# Patient Record
Sex: MALE | Race: WHITE
[De-identification: names, ages, dates, MRNs, and addresses within clinical notes are randomized per-mention and may not be internally consistent; named-entity substitution may affect disease eponyms.]

---

## 2019-10-29 ENCOUNTER — HOSPITAL ENCOUNTER (OUTPATIENT)
Dept: HOSPITAL 47 - OR | Age: 24
Discharge: HOME | End: 2019-10-29
Attending: SURGERY
Payer: MEDICAID

## 2019-10-29 VITALS — RESPIRATION RATE: 16 BRPM | TEMPERATURE: 98.5 F

## 2019-10-29 VITALS — BODY MASS INDEX: 25.7 KG/M2

## 2019-10-29 VITALS — DIASTOLIC BLOOD PRESSURE: 89 MMHG | HEART RATE: 82 BPM | SYSTOLIC BLOOD PRESSURE: 146 MMHG

## 2019-10-29 DIAGNOSIS — Z82.49: ICD-10-CM

## 2019-10-29 DIAGNOSIS — F84.0: ICD-10-CM

## 2019-10-29 DIAGNOSIS — Z91.048: ICD-10-CM

## 2019-10-29 DIAGNOSIS — L72.0: Primary | ICD-10-CM

## 2019-10-29 DIAGNOSIS — Z79.899: ICD-10-CM

## 2019-10-29 PROCEDURE — 88304 TISSUE EXAM BY PATHOLOGIST: CPT

## 2019-10-29 PROCEDURE — 11406 EXC TR-EXT B9+MARG >4.0 CM: CPT

## 2019-10-29 NOTE — P.OP
Date of Procedure: 10/29/19


Preoperative Diagnosis: 


Skin lesion on abdomen, possible sebaceous cyst


Postoperative Diagnosis: 


Skin lesion on abdomen, possible sebaceous cyst


Procedure(s) Performed: 


Excision lesion of the abdomen, 6 x 4 cm


Surgeon: Girish Kern


Pathology: other (skin lesion abdomen)


Condition: stable


Disposition: same day


Indications for Procedure: 


24-year-old developmentally delayed male presented secondary to skin lesion on 

the abdomen.  This lesion was noted to have significant skin changes along with 

occasional drainage.  Plan was for excision of this lesion.


Operative Findings: 


6 x 4 cm skin lesion excised, no purulent drainage


Description of Procedure: 


The patient was brought into the operating suite and placed in supine position 

on the operating table.  Sedation was provided by anesthesia and the patient was

prepped and draped in regular sterile fashion.  An elliptical incision was made 

over the palpable skin lesion site.  This measured 6 x 4 cm.  Dissection was 

carried to excise the lesion and completion with cautery.  Hemostasis was 

maintained.  Skin lesion was closed with multiple 2-0 nylon sutures in mattress 

form.  The patient was awakened in the operating suite and taken to 

postanesthesia care unit in stable condition.

## 2022-12-21 ENCOUNTER — HOSPITAL ENCOUNTER (OUTPATIENT)
Dept: HOSPITAL 47 - RADXRYALE | Age: 27
Discharge: HOME | End: 2022-12-21
Attending: INTERNAL MEDICINE
Payer: MEDICAID

## 2022-12-21 DIAGNOSIS — M79.671: Primary | ICD-10-CM

## 2022-12-21 NOTE — XR
EXAMINATION TYPE: XR foot complete RT

 

DATE OF EXAM: 12/21/2022

 

CLINICAL HISTORY: pain

 

TECHNIQUE: Frontal, lateral and oblique images of the right foot are obtained.

 

COMPARISON: None.

 

FINDINGS:  There is no acute fracture/dislocation evident. The joint spaces  appear within normal khan
its.  The overlying soft tissue appears unremarkable.

 

IMPRESSION:  

There is no acute fracture or dislocation.

 

ICD 10 NO FRACTURE, INITIAL EVALUATION

## 2022-12-22 ENCOUNTER — HOSPITAL ENCOUNTER (OUTPATIENT)
Dept: HOSPITAL 47 - RADXRYALE | Age: 27
Discharge: HOME | End: 2022-12-22
Attending: INTERNAL MEDICINE
Payer: MEDICAID

## 2022-12-22 DIAGNOSIS — S99.921A: Primary | ICD-10-CM

## 2022-12-22 NOTE — XR
EXAMINATION TYPE: XR ankle complete RT

 

DATE OF EXAM: 12/22/2022

 

COMPARISON: NONE

 

HISTORY: Pain

 

TECHNIQUE:  Frontal, lateral and oblique images of the right ankle are obtained.

 

COMPARISON: None.

 

FINDINGS:  There is no acute fracture/dislocation evident. The joint spaces  appear within normal khan
its.  The overlying soft tissue appears unremarkable.

 

IMPRESSION:  There is no acute fracture or dislocation seen.

## 2023-01-03 ENCOUNTER — HOSPITAL ENCOUNTER (OUTPATIENT)
Dept: HOSPITAL 47 - RADMRIMAIN | Age: 28
Discharge: HOME | End: 2023-01-03
Attending: INTERNAL MEDICINE
Payer: MEDICAID

## 2023-01-03 ENCOUNTER — HOSPITAL ENCOUNTER (OUTPATIENT)
Dept: HOSPITAL 47 - ORWHC2ENDO | Age: 28
Discharge: HOME | End: 2023-01-03
Attending: INTERNAL MEDICINE
Payer: MEDICAID

## 2023-01-03 VITALS — TEMPERATURE: 97.8 F

## 2023-01-03 VITALS — DIASTOLIC BLOOD PRESSURE: 84 MMHG | SYSTOLIC BLOOD PRESSURE: 134 MMHG | HEART RATE: 81 BPM | RESPIRATION RATE: 18 BRPM

## 2023-01-03 VITALS — BODY MASS INDEX: 25.9 KG/M2

## 2023-01-03 DIAGNOSIS — Z79.83: ICD-10-CM

## 2023-01-03 DIAGNOSIS — S99.921A: Primary | ICD-10-CM

## 2023-01-03 DIAGNOSIS — R60.0: ICD-10-CM

## 2023-01-03 DIAGNOSIS — Z88.8: ICD-10-CM

## 2023-01-03 DIAGNOSIS — F84.0: ICD-10-CM

## 2023-01-03 DIAGNOSIS — R56.9: ICD-10-CM

## 2023-01-03 DIAGNOSIS — Z79.811: ICD-10-CM

## 2023-01-03 DIAGNOSIS — K29.50: Primary | ICD-10-CM

## 2023-01-03 DIAGNOSIS — I25.10: ICD-10-CM

## 2023-01-03 DIAGNOSIS — Z79.1: ICD-10-CM

## 2023-01-03 DIAGNOSIS — K20.90: ICD-10-CM

## 2023-01-03 PROCEDURE — 43239 EGD BIOPSY SINGLE/MULTIPLE: CPT

## 2023-01-03 PROCEDURE — 73720 MRI LWR EXTREMITY W/O&W/DYE: CPT

## 2023-01-03 PROCEDURE — 88305 TISSUE EXAM BY PATHOLOGIST: CPT

## 2023-01-03 NOTE — P.PCN
Date of Procedure: 01/03/23


Procedure(s) Performed: 


BRIEF HISTORY: Patient is a 27-year-old, pleasant, white male scheduled for an 

upper endoscopy as a part of evaluation of intermittent dysphagia to solids. 





PROCEDURE PERFORMED: Esophagogastroduodenoscopy with biopsy.





PREOPERATIVE DIAGNOSIS: Intermittent dysphagia to solids. 





IV sedation per anesthesia. 





PROCEDURE: After informed consent was obtained, the patient  was brought into 

the endoscopy unit. IV sedation was administered by Anesthesia under continuous 

monitoring. Initially the Olympus GIF-140 video endoscope was inserted into the 

mouth. Esophagus intubated without any difficulty. It was gradually advanced 

into the stomach and duodenum and carefully examined. The bulb and the second 

part of the duodenum appeared normal. The scope at this time was withdrawn to 

the stomach, adequately insufflated with air, and upon careful examination, 

mucosa of the antrum, had gastritis and biopsies were done from this area.  

Mucosa of the body, cardia and the fundus appeared normal. The scope was then 

withdrawn into the esophagus. The GE junction was located at 43 cm from the 

incisors. The esophagus appeared normal. There were no erosions or ulcerations 

seen.  There was no evidence of esophageal stricture.  Multiple biopsies were 

done from the mid and distal esophagus to rule out eosinophilic esophagitis and 

the patient tolerated the procedure well. 





IMPRESSION: 


1.  Normal-appearing esophagus with no evidence of esophagitis or esophageal 

stricture.


2.  Mild antral gastritis.





RECOMMENDATIONS: The findings of this examination were discussed with the 

patient as well as his family.  He was advised to follow with the biopsy 

results.  Trial of omeprazole 20 mg daily for 6 weeks.  His no improvement in 

symptoms he can follow up in office..

## 2023-01-04 NOTE — MR
EXAMINATION TYPE: MR foot RT wo/w con

 

DATE OF EXAM: 1/3/2023

 

COMPARISON: None

 

HISTORY: Right foot injury, pain and swelling

 

CONTRAST: 

Standard multiplanar, multisequence MRI departmental protocol images were obtained without contrast a
nd with 10 mL intravenous Gadavist gadolinium contrast.  

 

There is increased signal on the T2 images in the anterior cuboidal bone. The metatarsals appear inta
ct. There is subcutaneous edema around the forefoot. There is soft tissue edema around the mid and di
stal second and third metatarsals. There is mild edema in the anterior calcaneus. There is some minim
al edema in the medial aspect of the base of the fifth metatarsal and the base of the third metatarsa
l.

 

IMPRESSION:

Subcutaneous edema around the foot. Increased fluid signal in the cuboidal bone and the third and fif
th metatarsals is consistent with bone bruise. Soft tissue edema around the second and third metatars
als. Nondisplaced fracture of the anterior cuboidal bone is possible.

## 2025-03-21 ENCOUNTER — HOSPITAL ENCOUNTER (OUTPATIENT)
Dept: HOSPITAL 47 - LABWHC1 | Age: 30
Discharge: HOME | End: 2025-03-21
Attending: INTERNAL MEDICINE
Payer: MEDICARE

## 2025-03-21 DIAGNOSIS — E03.9: Primary | ICD-10-CM

## 2025-03-21 PROCEDURE — 36415 COLL VENOUS BLD VENIPUNCTURE: CPT

## 2025-03-21 PROCEDURE — 84443 ASSAY THYROID STIM HORMONE: CPT
